# Patient Record
Sex: FEMALE | Race: WHITE
[De-identification: names, ages, dates, MRNs, and addresses within clinical notes are randomized per-mention and may not be internally consistent; named-entity substitution may affect disease eponyms.]

---
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
## 2023-04-05 PROBLEM — Q38.1 TONGUE TIE: Status: RESOLVED | Noted: 2018-08-21 | Resolved: 2023-04-05
